# Patient Record
Sex: MALE | Race: WHITE
[De-identification: names, ages, dates, MRNs, and addresses within clinical notes are randomized per-mention and may not be internally consistent; named-entity substitution may affect disease eponyms.]

---

## 2017-01-09 ENCOUNTER — HOSPITAL ENCOUNTER (OUTPATIENT)
Age: 77
Discharge: HOME | End: 2017-01-09
Payer: MEDICARE

## 2017-01-09 DIAGNOSIS — E78.5: Primary | ICD-10-CM

## 2017-01-09 DIAGNOSIS — M50.30: ICD-10-CM

## 2017-01-09 DIAGNOSIS — I65.29: ICD-10-CM

## 2017-01-09 DIAGNOSIS — R13.10: ICD-10-CM

## 2017-01-09 DIAGNOSIS — I10: ICD-10-CM

## 2017-01-18 ENCOUNTER — HOSPITAL ENCOUNTER (OUTPATIENT)
Age: 77
Discharge: HOME | End: 2017-01-18
Payer: MEDICARE

## 2017-01-18 DIAGNOSIS — R41.3: Primary | ICD-10-CM

## 2017-08-15 ENCOUNTER — HOSPITAL ENCOUNTER (OUTPATIENT)
Dept: HOSPITAL 76 - LAB.WCP | Age: 77
Discharge: HOME | End: 2017-08-15
Attending: FAMILY MEDICINE
Payer: MEDICARE

## 2017-08-15 DIAGNOSIS — R41.3: Primary | ICD-10-CM

## 2017-08-15 LAB
ALBUMIN/GLOB SERPL: 1.4 {RATIO} (ref 1–2.2)
ANION GAP SERPL CALCULATED.4IONS-SCNC: 7 MMOL/L (ref 6–13)
BILIRUB BLD-MCNC: 0.8 MG/DL (ref 0.2–1)
BUN SERPL-MCNC: 17 MG/DL (ref 6–20)
CALCIUM UR-MCNC: 9.1 MG/DL (ref 8.5–10.3)
CHLORIDE SERPL-SCNC: 100 MMOL/L (ref 101–111)
CO2 SERPL-SCNC: 29 MMOL/L (ref 21–32)
CREAT SERPLBLD-SCNC: 1.2 MG/DL (ref 0.6–1.2)
GFRSERPLBLD MDRD-ARVRAT: 59 ML/MIN/{1.73_M2} (ref 89–?)
GLOBULIN SER-MCNC: 3.1 G/DL (ref 2.1–4.2)
GLUCOSE SERPL-MCNC: 99 MG/DL (ref 70–100)
MAGNESIUM SERPL-MCNC: 2 MG/DL (ref 1.7–2.8)
POTASSIUM SERPL-SCNC: 4.9 MMOL/L (ref 3.5–5)
PROT SPEC-MCNC: 7.3 G/DL (ref 6.7–8.2)
SODIUM SERPLBLD-SCNC: 136 MMOL/L (ref 135–145)

## 2017-08-15 PROCEDURE — 36415 COLL VENOUS BLD VENIPUNCTURE: CPT

## 2017-08-15 PROCEDURE — 80053 COMPREHEN METABOLIC PANEL: CPT

## 2017-08-15 PROCEDURE — 82607 VITAMIN B-12: CPT

## 2017-08-15 PROCEDURE — 83735 ASSAY OF MAGNESIUM: CPT

## 2018-03-14 ENCOUNTER — HOSPITAL ENCOUNTER (OUTPATIENT)
Dept: HOSPITAL 76 - LAB.WCP | Age: 78
End: 2018-03-14
Attending: FAMILY MEDICINE
Payer: MEDICARE

## 2018-03-14 DIAGNOSIS — E78.5: Primary | ICD-10-CM

## 2018-03-14 DIAGNOSIS — I65.29: ICD-10-CM

## 2018-03-14 DIAGNOSIS — K21.9: ICD-10-CM

## 2018-03-14 DIAGNOSIS — R53.83: ICD-10-CM

## 2018-03-14 DIAGNOSIS — I10: ICD-10-CM

## 2018-03-14 LAB
ALBUMIN DIAFP-MCNC: 4.3 G/DL (ref 3.2–5.5)
ALBUMIN/GLOB SERPL: 1.4 {RATIO} (ref 1–2.2)
ALP SERPL-CCNC: 60 IU/L (ref 42–121)
ALT SERPL W P-5'-P-CCNC: 26 IU/L (ref 10–60)
ANION GAP SERPL CALCULATED.4IONS-SCNC: 11 MMOL/L (ref 6–13)
AST SERPL W P-5'-P-CCNC: 44 IU/L (ref 10–42)
BILIRUB BLD-MCNC: 0.9 MG/DL (ref 0.2–1)
BUN SERPL-MCNC: 11 MG/DL (ref 6–20)
CALCIUM UR-MCNC: 9.1 MG/DL (ref 8.5–10.3)
CHLORIDE SERPL-SCNC: 91 MMOL/L (ref 101–111)
CHOLEST SERPL-MCNC: 156 MG/DL
CO2 SERPL-SCNC: 29 MMOL/L (ref 21–32)
CREAT SERPLBLD-SCNC: 1.1 MG/DL (ref 0.6–1.2)
GFRSERPLBLD MDRD-ARVRAT: 65 ML/MIN/{1.73_M2} (ref 89–?)
GLOBULIN SER-MCNC: 3.1 G/DL (ref 2.1–4.2)
GLUCOSE SERPL-MCNC: 93 MG/DL (ref 70–100)
HDLC SERPL-MCNC: 66 MG/DL
HDLC SERPL: 2.4 {RATIO} (ref ?–5)
LDLC SERPL CALC-MCNC: 72 MG/DL
LDLC/HDLC SERPL: 1.1 {RATIO} (ref ?–3.6)
MAGNESIUM SERPL-MCNC: 1.8 MG/DL (ref 1.7–2.8)
PROT SPEC-MCNC: 7.4 G/DL (ref 6.7–8.2)
SODIUM SERPLBLD-SCNC: 131 MMOL/L (ref 135–145)
VLDLC SERPL-SCNC: 18 MG/DL

## 2018-03-14 PROCEDURE — 80053 COMPREHEN METABOLIC PANEL: CPT

## 2018-03-14 PROCEDURE — 83721 ASSAY OF BLOOD LIPOPROTEIN: CPT

## 2018-03-14 PROCEDURE — 80061 LIPID PANEL: CPT

## 2018-03-14 PROCEDURE — 36415 COLL VENOUS BLD VENIPUNCTURE: CPT

## 2018-03-14 PROCEDURE — 83735 ASSAY OF MAGNESIUM: CPT

## 2018-03-14 PROCEDURE — 82607 VITAMIN B-12: CPT

## 2018-11-16 ENCOUNTER — HOSPITAL ENCOUNTER (OUTPATIENT)
Dept: HOSPITAL 76 - LAB.WCP | Age: 78
End: 2018-11-16
Attending: FAMILY MEDICINE
Payer: MEDICARE

## 2018-11-16 DIAGNOSIS — R53.83: ICD-10-CM

## 2018-11-16 DIAGNOSIS — E87.1: Primary | ICD-10-CM

## 2018-11-16 LAB
ALBUMIN DIAFP-MCNC: 4.3 G/DL (ref 3.2–5.5)
ALBUMIN/GLOB SERPL: 1.4 {RATIO} (ref 1–2.2)
ALP SERPL-CCNC: 75 IU/L (ref 42–121)
ALT SERPL W P-5'-P-CCNC: 19 IU/L (ref 10–60)
ANION GAP SERPL CALCULATED.4IONS-SCNC: 11 MMOL/L (ref 6–13)
AST SERPL W P-5'-P-CCNC: 33 IU/L (ref 10–42)
BILIRUB BLD-MCNC: 1 MG/DL (ref 0.2–1)
BUN SERPL-MCNC: 13 MG/DL (ref 6–20)
CALCIUM UR-MCNC: 9.3 MG/DL (ref 8.5–10.3)
CHLORIDE SERPL-SCNC: 91 MMOL/L (ref 101–111)
CO2 SERPL-SCNC: 30 MMOL/L (ref 21–32)
CREAT SERPLBLD-SCNC: 1.1 MG/DL (ref 0.6–1.2)
GFRSERPLBLD MDRD-ARVRAT: 65 ML/MIN/{1.73_M2} (ref 89–?)
GLOBULIN SER-MCNC: 3.1 G/DL (ref 2.1–4.2)
GLUCOSE SERPL-MCNC: 96 MG/DL (ref 70–100)
MAGNESIUM SERPL-MCNC: 1.9 MG/DL (ref 1.7–2.8)
PROT SPEC-MCNC: 7.4 G/DL (ref 6.7–8.2)
SODIUM SERPLBLD-SCNC: 132 MMOL/L (ref 135–145)

## 2018-11-16 PROCEDURE — 80053 COMPREHEN METABOLIC PANEL: CPT

## 2018-11-16 PROCEDURE — 82607 VITAMIN B-12: CPT

## 2018-11-16 PROCEDURE — 36415 COLL VENOUS BLD VENIPUNCTURE: CPT

## 2018-11-16 PROCEDURE — 83735 ASSAY OF MAGNESIUM: CPT

## 2019-12-17 ENCOUNTER — HOSPITAL ENCOUNTER (OUTPATIENT)
Dept: HOSPITAL 76 - LAB.WCP | Age: 79
Discharge: HOME | End: 2019-12-17
Attending: FAMILY MEDICINE
Payer: MEDICARE

## 2019-12-17 DIAGNOSIS — E87.1: ICD-10-CM

## 2019-12-17 DIAGNOSIS — E78.5: Primary | ICD-10-CM

## 2019-12-17 DIAGNOSIS — R79.89: ICD-10-CM

## 2019-12-17 LAB
ALBUMIN DIAFP-MCNC: 4.3 G/DL (ref 3.2–5.5)
ALBUMIN/GLOB SERPL: 1.3 {RATIO} (ref 1–2.2)
ALP SERPL-CCNC: 60 IU/L (ref 42–121)
ALT SERPL W P-5'-P-CCNC: 23 IU/L (ref 10–60)
ANION GAP SERPL CALCULATED.4IONS-SCNC: 11 MMOL/L (ref 6–13)
AST SERPL W P-5'-P-CCNC: 35 IU/L (ref 10–42)
BASOPHILS NFR BLD AUTO: 0.1 10^3/UL (ref 0–0.1)
BASOPHILS NFR BLD AUTO: 1 %
BILIRUB BLD-MCNC: 0.8 MG/DL (ref 0.2–1)
BUN SERPL-MCNC: 11 MG/DL (ref 6–20)
CALCIUM UR-MCNC: 9.4 MG/DL (ref 8.5–10.3)
CHLORIDE SERPL-SCNC: 90 MMOL/L (ref 101–111)
CHOLEST SERPL-MCNC: 148 MG/DL
CO2 SERPL-SCNC: 31 MMOL/L (ref 21–32)
CREAT SERPLBLD-SCNC: 1 MG/DL (ref 0.6–1.2)
EOSINOPHIL # BLD AUTO: 0.9 10^3/UL (ref 0–0.7)
EOSINOPHIL NFR BLD AUTO: 14.4 %
ERYTHROCYTE [DISTWIDTH] IN BLOOD BY AUTOMATED COUNT: 17.2 % (ref 12–15)
GFRSERPLBLD MDRD-ARVRAT: 72 ML/MIN/{1.73_M2} (ref 89–?)
GLOBULIN SER-MCNC: 3.4 G/DL (ref 2.1–4.2)
GLUCOSE SERPL-MCNC: 95 MG/DL (ref 70–100)
HDLC SERPL-MCNC: 74 MG/DL
HDLC SERPL: 2 {RATIO} (ref ?–5)
HGB UR QL STRIP: 13.1 G/DL (ref 14–18)
LDLC SERPL CALC-MCNC: 55 MG/DL
LDLC/HDLC SERPL: 0.7 {RATIO} (ref ?–3.6)
LYMPHOCYTES # SPEC AUTO: 1.4 10^3/UL (ref 1.5–3.5)
LYMPHOCYTES NFR BLD AUTO: 23 %
MCH RBC QN AUTO: 25.3 PG (ref 27–31)
MCHC RBC AUTO-ENTMCNC: 31.7 G/DL (ref 32–36)
MCV RBC AUTO: 79.9 FL (ref 80–94)
MONOCYTES # BLD AUTO: 0.6 10^3/UL (ref 0–1)
MONOCYTES NFR BLD AUTO: 8.8 %
NEUTROPHILS # BLD AUTO: 3.3 10^3/UL (ref 1.5–6.6)
NEUTROPHILS # SNV AUTO: 6.2 X10^3/UL (ref 4.8–10.8)
NEUTROPHILS NFR BLD AUTO: 52.6 %
PDW BLD AUTO: 9.8 FL (ref 7.4–11.4)
PLATELET # BLD: 340 10^3/UL (ref 130–450)
PROT SPEC-MCNC: 7.7 G/DL (ref 6.7–8.2)
RBC MAR: 5.17 10^6/UL (ref 4.7–6.1)
SODIUM SERPLBLD-SCNC: 132 MMOL/L (ref 135–145)
VLDLC SERPL-SCNC: 19 MG/DL

## 2019-12-17 PROCEDURE — 80053 COMPREHEN METABOLIC PANEL: CPT

## 2019-12-17 PROCEDURE — 80061 LIPID PANEL: CPT

## 2019-12-17 PROCEDURE — 36415 COLL VENOUS BLD VENIPUNCTURE: CPT

## 2019-12-17 PROCEDURE — 85025 COMPLETE CBC W/AUTO DIFF WBC: CPT

## 2019-12-17 PROCEDURE — 84443 ASSAY THYROID STIM HORMONE: CPT

## 2019-12-17 PROCEDURE — 83721 ASSAY OF BLOOD LIPOPROTEIN: CPT

## 2019-12-20 ENCOUNTER — HOSPITAL ENCOUNTER (OUTPATIENT)
Dept: HOSPITAL 76 - LAB.WCP | Age: 79
Discharge: HOME | End: 2019-12-20
Attending: FAMILY MEDICINE
Payer: MEDICARE

## 2019-12-20 DIAGNOSIS — E87.1: ICD-10-CM

## 2019-12-20 DIAGNOSIS — K21.9: Primary | ICD-10-CM

## 2019-12-20 DIAGNOSIS — R71.8: ICD-10-CM

## 2019-12-20 DIAGNOSIS — I10: ICD-10-CM

## 2019-12-20 LAB
FERRITIN SERPL-MCNC: 11.3 NG/ML (ref 23.9–336.2)
IRON SATN MFR SERPL: 9 % (ref 20–50)
IRON SERPL-MCNC: 42 UG/DL (ref 45–182)
MAGNESIUM SERPL-MCNC: 2.1 MG/DL (ref 1.7–2.8)
TIBC SERPL-MCNC: 442 UG/DL (ref 250–450)
TRANSFERRIN SERPL-MCNC: 316 MG/DL (ref 180–329)
VIT B12 SERPL-MCNC: 668 PG/ML (ref 180–914)

## 2019-12-20 PROCEDURE — 82607 VITAMIN B-12: CPT

## 2019-12-20 PROCEDURE — 83540 ASSAY OF IRON: CPT

## 2019-12-20 PROCEDURE — 84466 ASSAY OF TRANSFERRIN: CPT

## 2019-12-20 PROCEDURE — 82728 ASSAY OF FERRITIN: CPT

## 2019-12-20 PROCEDURE — 83735 ASSAY OF MAGNESIUM: CPT

## 2019-12-20 PROCEDURE — 36415 COLL VENOUS BLD VENIPUNCTURE: CPT

## 2021-06-05 ENCOUNTER — HOSPITAL ENCOUNTER (OUTPATIENT)
Dept: HOSPITAL 76 - DI.N | Age: 81
Discharge: HOME | End: 2021-06-05
Attending: FAMILY MEDICINE
Payer: MEDICARE

## 2021-06-05 DIAGNOSIS — M19.022: Primary | ICD-10-CM

## 2021-06-05 NOTE — XRAY REPORT
PROCEDURE:  Elbow 3 View LT

 

INDICATIONS:  OLECRENON AVULSION FX

 

TECHNIQUE:  3 views of the elbow were acquired.  

 

COMPARISON:  Elbow plain films on the left 5/22/2021.

 

FINDINGS:  

Bones:  No fractures or dislocations but there is moderately severe degenerative elbow joint osteoart
hritic change most prominently at the olecranon fossa but also at the radial head..  No suspicious sana
ny lesions.  

 

Soft tissues:  No elbow joint effusion.  No suspicious soft tissue calcifications.  

 

IMPRESSION:  

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
e but no trauma is found.

 

Reviewed by: Martinez Brewer MD on 6/5/2021 11:45 AM YADIRA

Approved by: Martinez Brewer MD on 6/5/2021 11:45 AM YADIRA

 

 

Station ID:  [id number]

## 2021-07-13 ENCOUNTER — HOSPITAL ENCOUNTER (OUTPATIENT)
Dept: HOSPITAL 76 - DI | Age: 81
Discharge: HOME | End: 2021-07-13
Attending: INTERNAL MEDICINE
Payer: OTHER GOVERNMENT

## 2021-07-13 DIAGNOSIS — G93.89: ICD-10-CM

## 2021-07-13 DIAGNOSIS — R41.3: Primary | ICD-10-CM

## 2021-07-13 NOTE — CT REPORT
PROCEDURE:  HEAD WO

 

INDICATIONS:  MEMORY ISSUES

 

TECHNIQUE:  

Noncontrast 4.5 mm thick angled axial sections acquired from the foramen magnum to the vertex.  For r
adiation dose reduction, the following was used:  automated exposure control, adjustment of mA and/or
 kV according to patient size.

 

COMPARISON:  None.

 

FINDINGS:  

Image quality:  Excellent.  

 

CSF spaces:  Basal cisterns are patent.  No extra-axial fluid collections.  Ventricles are normal in 
size and shape.  

 

Brain: Focal encephalomalacia and volume loss can be seen within the left frontal lobe. No midline sh
ift.  No intracranial masses or hemorrhage.  Gray-white matter interface is normal.  

 

Skull and face:  There is partial absence of the left frontal bone anteriorly. Numerous metallic frag
ments can be seen within the soft tissues of the left forehead. Calvarium and visualized facial bones
 are intact, without suspicious lesions.  

 

Sinuses:  Moderate mucosal thickening is seen within the ethmoid air cells. Visualized sinuses and ma
stoids are otherwise clear.  

 

 

 

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

 

Loss of a portion of the left frontal bone, with overlying metallic fragments.

 

Reviewed by: Jonathan Loco MD on 7/13/2021 10:54 AM YADIRA

Approved by: Jonathan Loco MD on 7/13/2021 10:54 AM YADIRA

 

 

Station ID:  [id number]

## 2021-10-19 ENCOUNTER — HOSPITAL ENCOUNTER (OUTPATIENT)
Dept: HOSPITAL 76 - SC | Age: 81
Discharge: HOME | End: 2021-10-19
Attending: NURSE PRACTITIONER
Payer: OTHER GOVERNMENT

## 2021-10-19 VITALS — DIASTOLIC BLOOD PRESSURE: 88 MMHG | SYSTOLIC BLOOD PRESSURE: 142 MMHG

## 2021-10-19 DIAGNOSIS — R06.83: Primary | ICD-10-CM

## 2021-10-19 DIAGNOSIS — Z87.891: ICD-10-CM

## 2021-10-19 PROCEDURE — 99203 OFFICE O/P NEW LOW 30 MIN: CPT

## 2021-10-19 PROCEDURE — 99212 OFFICE O/P EST SF 10 MIN: CPT

## 2021-10-19 NOTE — SLEEP CARE CONSULTATION
Information from patient questionnaire entered by Adeola Rinaldi.





I have reviewed and concur with the information entered by Adeola Rinaldi. This 

document represents the service I personally performed and the decisions made by

me, Joceline Lopez ARNP.





History of Present Illness


Service Date and Time: 10/19/2021    1452


Reason for Visit: New patient, Re-establish care (AHI 3.5 for 2008 study), Other

(Using dental device, needs new script to update)


Chief Complaint: reports: Snoring (device for snoring)


Date of Onset: years


Usual bedtime: 5996-5388


Time it takes to fall asleep: 5 minute


Snores at night: Yes


Observed to quit breathing while asleep: Yes


Sleeps alone due to snoring: No


Number of times waking at night: 2-4


Reasons for waking at night: reports: Bathroom, Other (TB)


Toss, Turn, or Twitch while sleeping: No (haven't noticed)


Recalls having dreams: Yes


Usually gets out of bed at: 0894-4513


Feels refreshed in the morning: Yes


Morning headache: No


Sleepy or fatigued during the day: Yes


Takes day naps: Yes


Dreams during day naps: No (just rest)


Prior sleep studies: Yes


Year and Where: 2008 [facility] (AHI 3.5)


Type of Sleep Study: Polysomnography


Additional HPI information: 





I had the pleasure of seeing PEPE KEE today accompanied by his wife 

regarding the possibility of him having a sleep disorder. His current complaint 

is loud and frequent snoring that is worse on his back. Patient was seen here in

2008 and had a sleep study which did not show any significant sleep disordered 

breathing at that time.  His oral appliance for snoring broke about 3-5 years 

ago. He tried a positional belt to stay off of his back to control snoring but 

he did not tolerate using it. [facility] 

pres[facility] for the oral snoring device. He did a home sleep study about 4 

months ago but does not have a copy of the study with him. He has already been 

given the name of three places by [facility] to [facility]. 








- Parasomnia Symptoms


Ever been unable to move upon waking from sleep: No


Walks in sleep: No


Talks in sleep: No (not sure)


Ever acted out dreams in sleep: No


Ever felt weak in the knees when startled or emotional: Yes (PTSD)


Bothered by creepy, crawly, restless sensations in legs: No


Problems with memory or concentration: Yes





Subjective


Initial [place] Sleepiness Scale score: 8 (in 2021)





Past Medical History


Past Medical History: reports: Hypertension, Arthritis, Other (Reynauds disease)





Social History


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 Patient is  and lives in [place]. 





Have you smoked in the past 12 months: No


Quit date: 11/14/1967


Alcohol use: Yes


Alcohol amount and frequency: 2 rye and water +/-, daily


Caffeine use: Yes


Caffeine amount and frequency: 3 cups daily in morning





Family History


Family history of sleep disordered breathing: Yes


Family Hx Sleep Apnea: Mother: Snoring





Allergies and Home Medications


Drug allergies reviewed: Yes (Telmisartan, Lisinopril)


Home medication list reviewed: Yes


Allergy and home medication list: 





Lipitor


Acyclovir


Tamusolin


baby aspirin


HCTZ


Tylenol


Amoxicillin prior to procedures 











Review of Systems


Cardiovascular: reports: high blood pressure


Gastrointestinal: reports: heartburn


Urinary: reports: frequency


Neurological: reports: head trauma


Endocrine: reports: too hot or cold (*cold, reynods)


Musculoskeletal: reports: joint pain


Immunologic: reports: rash





Physical Exam


Blood Pressure: 142/88 (left arm)


Cuff size: long


Heart Rate: 80


O2 Saturation: 96


Height: 5 ft 8 in


Weight: 160 lb


Body Mass Index: 24.3


BMI Classification: Healthy weight


Heart: regular rate and rhythm


Lungs: clear bilaterally





Impression and Plan





1. Snoring. Patient has a history of loud snoring especially when supine.  He 

last did a sleep study in 2008 which showed an average AHI of 3.1 with no supine

sleep. He returns today to get a prescription for an oral appliance to control 

his snoring. I discussed with him and his wife that I need a copy of the recent 

sleep study to see what type of appliance he is in need of before I can write a 

prescription for one. They are to call us with information on obtaining this 

study or obtain a copy and bring it to the office. They voiced understanding and

agreement with plan.  





* Patient to obtain copy of HST for me to review


* Avoid alcohol consumption near bedtime


* Return in 1-2 months for follow up depending upon HST results.











Counseling Topics: Sleeping position, Weight control


Visit Type: In Office


Other Participants: Spouse/Significant Other


Time Spent with Patient (minutes): 36


Provider Statement: I spent 100% of the Face to Face Visit with the patient with

greater than 50% spent counseling the patient and coordination of care.

## 2023-02-02 ENCOUNTER — HOSPITAL ENCOUNTER (OUTPATIENT)
Dept: HOSPITAL 76 - LAB.N | Age: 83
Discharge: HOME | End: 2023-02-02
Attending: NURSE PRACTITIONER
Payer: OTHER GOVERNMENT

## 2023-02-02 DIAGNOSIS — E87.1: Primary | ICD-10-CM

## 2023-02-02 DIAGNOSIS — D50.9: ICD-10-CM

## 2023-02-02 DIAGNOSIS — E78.5: ICD-10-CM

## 2023-02-02 LAB
ALBUMIN DIAFP-MCNC: 4.2 G/DL (ref 3.2–5.5)
ALBUMIN/GLOB SERPL: 1.1 {RATIO} (ref 1–2.2)
ALP SERPL-CCNC: 70 IU/L (ref 42–121)
ALT SERPL W P-5'-P-CCNC: 17 IU/L (ref 10–60)
ANION GAP SERPL CALCULATED.4IONS-SCNC: 11 MMOL/L (ref 6–13)
AST SERPL W P-5'-P-CCNC: 27 IU/L (ref 10–42)
BILIRUB BLD-MCNC: 1.3 MG/DL (ref 0.2–1)
BUN SERPL-MCNC: 9 MG/DL (ref 6–20)
CALCIUM UR-MCNC: 9.8 MG/DL (ref 8.5–10.3)
CHLORIDE SERPL-SCNC: 92 MMOL/L (ref 101–111)
CHOLEST SERPL-MCNC: 161 MG/DL
CO2 SERPL-SCNC: 31 MMOL/L (ref 21–32)
CREAT SERPLBLD-SCNC: 0.9 MG/DL (ref 0.6–1.2)
ERYTHROCYTE [DISTWIDTH] IN BLOOD BY AUTOMATED COUNT: 12.9 % (ref 12–15)
GFRSERPLBLD MDRD-ARVRAT: 81 ML/MIN/{1.73_M2} (ref 89–?)
GLOBULIN SER-MCNC: 3.9 G/DL (ref 2.1–4.2)
GLUCOSE SERPL-MCNC: 105 MG/DL (ref 70–100)
HCT VFR BLD AUTO: 47.1 % (ref 42–52)
HDLC SERPL-MCNC: 81 MG/DL
HDLC SERPL: 2 {RATIO} (ref ?–5)
HGB UR QL STRIP: 16.2 G/DL (ref 14–18)
IRON SATN MFR SERPL: 35 % (ref 20–50)
IRON SERPL-MCNC: 128 UG/DL (ref 45–182)
LDLC SERPL CALC-MCNC: 59 MG/DL
LDLC/HDLC SERPL: 0.7 {RATIO} (ref ?–3.6)
MCH RBC QN AUTO: 31.3 PG (ref 27–31)
MCHC RBC AUTO-ENTMCNC: 34.4 G/DL (ref 32–36)
MCV RBC AUTO: 90.9 FL (ref 80–94)
NEUTROPHILS # SNV AUTO: 7.1 X10^3/UL (ref 4.8–10.8)
PDW BLD AUTO: 9.7 FL (ref 7.4–11.4)
PLATELET # BLD: 296 10^3/UL (ref 130–450)
POTASSIUM SERPL-SCNC: 3.9 MMOL/L (ref 3.5–5)
PROT SPEC-MCNC: 8.1 G/DL (ref 6.7–8.2)
RBC MAR: 5.18 10^6/UL (ref 4.7–6.1)
SODIUM SERPLBLD-SCNC: 134 MMOL/L (ref 135–145)
TIBC SERPL-MCNC: 371 UG/DL (ref 250–450)
TRANSFERRIN SERPL-MCNC: 265 MG/DL (ref 180–329)
TRIGL P FAST SERPL-MCNC: 105 MG/DL
VLDLC SERPL-SCNC: 21 MG/DL

## 2023-02-02 PROCEDURE — 83721 ASSAY OF BLOOD LIPOPROTEIN: CPT

## 2023-02-02 PROCEDURE — 80053 COMPREHEN METABOLIC PANEL: CPT

## 2023-02-02 PROCEDURE — 36415 COLL VENOUS BLD VENIPUNCTURE: CPT

## 2023-02-02 PROCEDURE — 85027 COMPLETE CBC AUTOMATED: CPT

## 2023-02-02 PROCEDURE — 84466 ASSAY OF TRANSFERRIN: CPT

## 2023-02-02 PROCEDURE — 83540 ASSAY OF IRON: CPT

## 2023-02-02 PROCEDURE — 80061 LIPID PANEL: CPT

## 2023-02-02 PROCEDURE — 82728 ASSAY OF FERRITIN: CPT
